# Patient Record
Sex: FEMALE | Race: WHITE | NOT HISPANIC OR LATINO | Employment: UNEMPLOYED | URBAN - METROPOLITAN AREA
[De-identification: names, ages, dates, MRNs, and addresses within clinical notes are randomized per-mention and may not be internally consistent; named-entity substitution may affect disease eponyms.]

---

## 2017-01-10 ENCOUNTER — OFFICE VISIT (OUTPATIENT)
Dept: PHYSICAL MEDICINE AND REHAB | Facility: CLINIC | Age: 37
End: 2017-01-10
Payer: MEDICAID

## 2017-01-10 VITALS
RESPIRATION RATE: 14 BRPM | SYSTOLIC BLOOD PRESSURE: 101 MMHG | WEIGHT: 121 LBS | BODY MASS INDEX: 22.26 KG/M2 | HEART RATE: 96 BPM | HEIGHT: 62 IN | DIASTOLIC BLOOD PRESSURE: 66 MMHG

## 2017-01-10 DIAGNOSIS — M47.817 LUMBOSACRAL SPONDYLOSIS WITHOUT MYELOPATHY: Primary | ICD-10-CM

## 2017-01-10 DIAGNOSIS — M54.10 RADICULAR SYNDROME OF LEFT LEG: ICD-10-CM

## 2017-01-10 PROCEDURE — 99999 PR PBB SHADOW E&M-EST. PATIENT-LVL III: CPT | Mod: PBBFAC,,, | Performed by: PHYSICAL MEDICINE & REHABILITATION

## 2017-01-10 PROCEDURE — 99213 OFFICE O/P EST LOW 20 MIN: CPT | Mod: PBBFAC,PO | Performed by: PHYSICAL MEDICINE & REHABILITATION

## 2017-01-10 PROCEDURE — 99213 OFFICE O/P EST LOW 20 MIN: CPT | Mod: S$PBB,,, | Performed by: PHYSICAL MEDICINE & REHABILITATION

## 2017-01-10 RX ORDER — METHOCARBAMOL 750 MG/1
750 TABLET, FILM COATED ORAL NIGHTLY PRN
Qty: 30 TABLET | Refills: 5 | Status: SHIPPED | OUTPATIENT
Start: 2017-01-10 | End: 2017-11-27 | Stop reason: SDUPTHER

## 2017-01-10 RX ORDER — TRAMADOL HYDROCHLORIDE 50 MG/1
50-100 TABLET ORAL 4 TIMES DAILY PRN
Qty: 240 TABLET | Refills: 5 | Status: SHIPPED | OUTPATIENT
Start: 2017-01-10 | End: 2017-07-19 | Stop reason: SDUPTHER

## 2017-01-10 RX ORDER — IBUPROFEN 600 MG/1
600 TABLET ORAL 3 TIMES DAILY
Qty: 60 TABLET | Refills: 5 | Status: SHIPPED | OUTPATIENT
Start: 2017-01-10 | End: 2017-06-16

## 2017-01-10 NOTE — MR AVS SNAPSHOT
[facility]  [address] [place] [address]  Phone: [phone]  Fax: [phone]                  Yue Reyes   1/10/2017 10:40 AM   Office Visit    Description:  Female : 1980   Provider:  Mayra Adler MD   Department:  [facility]           Reason for Visit     Back Pain           Diagnoses this Visit        Comments    Lumbosacral spondylosis without myelopathy    -  Primary     Radicular syndrome of left leg                To Do List           Future Appointments        Provider Department Dept Phone    7/10/2017 11:00 AM Mayra Adler MD [facility] [phone]      Goals (5 Years of Data)     None      Follow-Up and Disposition     Return in about 6 months (around 7/10/2017).    Follow-up and Disposition History       These Medications        Disp Refills Start End    ibuprofen (ADVIL,MOTRIN) 600 MG tablet 60 tablet 5 1/10/2017     Take 1 tablet (600 mg total) by mouth 3 (three) times daily. - Oral    Pharmacy: [facility] [address] [place] [address] [address] AT [facility] [address] Ph #: [phone]       methocarbamol (ROBAXIN) 750 MG Tab 30 tablet 5 1/10/2017     Take 1 tablet (750 mg total) by mouth nightly as needed. - Oral    Pharmacy: [facility] [address] [place] [address] [address] AT [facility] [address] Ph #: [phone]       tramadol (ULTRAM) 50 mg tablet 240 tablet 5 1/10/2017     Take 1-2 tablets ( mg total) by mouth 4 (four) times daily as needed. - Oral    Pharmacy: [facility] [address] [place] [address] [address] AT [facility] [address] Ph #: [phone]         [facility]     [facility] On [facility] Nurse Care Line -  Assistance  Registered nurses in the [facility] Call Center provide clinical advisement, health education, appointment booking, and other advisory services.  Call for this free service at [phone].             Medications           Message regarding Medications      "Verify the changes and/or additions to your medication regime listed below are the same as discussed with your clinician today.  If any of these changes or additions are incorrect, please notify your healthcare provider.        CHANGE how you are taking these medications     Start Taking Instead of    tramadol (ULTRAM) 50 mg tablet tramadol (ULTRAM) 50 mg tablet    Dosage:  Take 1-2 tablets ( mg total) by mouth 4 (four) times daily as needed. Dosage:  Take 1-2 tablets ( mg total) by mouth 3 (three) times daily as needed.    Reason for Change:  Reorder            Verify that the below list of medications is an accurate representation of the medications you are currently taking.  If none reported, the list may be blank. If incorrect, please contact your healthcare provider. Carry this list with you in case of emergency.           Current Medications     gabapentin (NEURONTIN) 300 MG capsule Take 1 capsule (300 mg total) by mouth 3 (three) times daily.    ibuprofen (ADVIL,MOTRIN) 600 MG tablet Take 1 tablet (600 mg total) by mouth 3 (three) times daily.    methocarbamol (ROBAXIN) 750 MG Tab Take 1 tablet (750 mg total) by mouth nightly as needed.    tramadol (ULTRAM) 50 mg tablet Take 1-2 tablets ( mg total) by mouth 4 (four) times daily as needed.           Clinical Reference Information           Vital Signs - Last Recorded  Most recent update: 1/10/2017 11:01 AM by Louann Sanches LPN    BP Pulse Resp Ht Wt BMI    101/66 96 14 5' 2" (1.575 m) 54.9 kg (121 lb) 22.13 kg/m2      Blood Pressure          Most Recent Value    BP  101/66      Allergies as of 1/10/2017     Tizanidine      Immunizations Administered on Date of Encounter - 1/10/2017     None      [facility] Sign-Up     Activating your [facility] account is as easy as 1-2-3!     1) Visit [email or web address], select Sign Up Now, enter this activation code and your date of birth, then select Next.  [email or web address]  Expires: 2/24/2017 11:17 AM  "     2) Create a username and password to use when you visit [facility] in the future and select a security question in case you lose your password and select Next.    3) Enter your e-mail address and click Sign Up!    Additional Information  If you have questions, please e-mail [email or web address] or call [phone] to talk to our [facility] staff. Remember, [facility] is NOT to be used for urgent needs. For medical emergencies, dial 911.

## 2017-01-10 NOTE — PROGRESS NOTES
PM&R  CLINIC NOTE  Chief Complaint   Patient presents with    Back Pain     low back, to the leg     HPI: This is a 36 y.o. year old female being seen in clinic today for follow up back pain.  She reports improvement with steroid pack that has helped until recently. She was unable to find a viable insurance option.      History obtained from patient    ROS  General- denies lethargy, weight change, fever, chills  Head/neck- denies swallowing difficulties  ENT- denies hearing changes  Cardiovascular-denies chest pain  Pulmonary- denies shortness of breath  GI- denies constipation or bowel incontinence  - denies bladder incontinence  Skin- denies wounds or rashes  Musculoskeletal- denies weakness, +pain  Neurologic- +numbness and tingling  Psychiatric- denies depressive or psychotic features, denies anxiety  Lymphatic-denies swelling  Endocrine- denies hypoglycemic symptoms/DM history      Physical Examination:  General: Well developed, well nourished female, NAD    Spinal Examination: LUMBAR or THORACIC  Active ROM is within normal limits.  Inspection: No deformity of spinal alignment. No palpable olisthesis.  Palpation:  ttp si joints and glut musculature, tight paraspinal muscles  ttp at 10/18 fibromyalgia points  Seated slr negative    Bilateral upper and lower Extremities:  Hip/Knee/Ankle ROM wnl  Bilateral Extremities show normal capillary refill. No signs of cyanosis, rubor, edema, skin changes, or dysvascular changes of appendages. Nails appear intact.  5/5 strength bilateral lower exts    No focal atrophy is noted of either upper or lower extremity.    Gait: normal stride, narrow base and good arm swing.    IMPRESSION/PLAN: This is a 36 y.o. year old female with mild lumbar DJD, left leg radiculitis, myofascial pain/fibromyalgia, very mild CTS, right knee pain-stable    1. Cont core strengthening, myofascial release, stretch, ROM, modalities, HEP  2. Gabapentin 300-900 mg QHS  3. Cont ibuprofen  4. Cont  tramadol 50-100mg TID prn sparingly  5. Cont si belt, cont neutral wrist braces  6. MRI and referral for possible RODGER (pt aware of lack of insurance coverage)    Mayra Adler MD  Physical Medicine and Rehab

## 2017-01-12 DIAGNOSIS — M47.817 LUMBOSACRAL SPONDYLOSIS WITHOUT MYELOPATHY: ICD-10-CM

## 2017-01-12 RX ORDER — TRAMADOL HYDROCHLORIDE 50 MG/1
TABLET ORAL
Qty: 90 TABLET | Refills: 0 | OUTPATIENT
Start: 2017-01-12

## 2017-06-16 ENCOUNTER — OFFICE VISIT (OUTPATIENT)
Dept: PHYSICAL MEDICINE AND REHAB | Facility: CLINIC | Age: 37
End: 2017-06-16
Payer: MEDICAID

## 2017-06-16 VITALS
WEIGHT: 120 LBS | RESPIRATION RATE: 14 BRPM | HEART RATE: 82 BPM | SYSTOLIC BLOOD PRESSURE: 92 MMHG | HEIGHT: 62 IN | DIASTOLIC BLOOD PRESSURE: 62 MMHG | BODY MASS INDEX: 22.08 KG/M2

## 2017-06-16 DIAGNOSIS — M25.50 MULTIPLE JOINT PAIN: ICD-10-CM

## 2017-06-16 DIAGNOSIS — M47.817 LUMBOSACRAL SPONDYLOSIS WITHOUT MYELOPATHY: Primary | ICD-10-CM

## 2017-06-16 PROCEDURE — 99213 OFFICE O/P EST LOW 20 MIN: CPT | Mod: S$PBB,,, | Performed by: PHYSICAL MEDICINE & REHABILITATION

## 2017-06-16 PROCEDURE — 99999 PR PBB SHADOW E&M-EST. PATIENT-LVL III: CPT | Mod: PBBFAC,,, | Performed by: PHYSICAL MEDICINE & REHABILITATION

## 2017-06-16 PROCEDURE — 99213 OFFICE O/P EST LOW 20 MIN: CPT | Mod: PBBFAC,PO | Performed by: PHYSICAL MEDICINE & REHABILITATION

## 2017-06-16 NOTE — PROGRESS NOTES
PM&R  CLINIC NOTE  Chief Complaint   Patient presents with    Hip Pain     bilateral; down to the legs     HPI: This is a 37 y.o. year old female being seen in clinic today for follow up back pain.  She reports issues with tramadol and plans to taper off to dc.  Advil helps somewhat.  Her main concern is multiple joint achy pain-hands, low back, hips, and knees.      History obtained from patient    ROS  General- denies lethargy, weight change, fever, chills  Head/neck- denies swallowing difficulties  ENT- denies hearing changes  Cardiovascular-denies chest pain  Pulmonary- denies shortness of breath  GI- denies constipation or bowel incontinence  - denies bladder incontinence  Skin- denies wounds or rashes  Musculoskeletal- denies weakness, +pain  Neurologic- +numbness and tingling  Psychiatric- denies depressive or psychotic features, denies anxiety  Lymphatic-denies swelling  Endocrine- denies hypoglycemic symptoms/DM history      Physical Examination:  General: Well developed, well nourished female, NAD    Spinal Examination: LUMBAR or THORACIC  Active ROM is within normal limits.  Inspection: No deformity of spinal alignment. No palpable olisthesis.  Palpation:still ttp si joints and glut musculature, tight paraspinal muscles  ttp at 10/18 fibromyalgia points  Seated slr negative    Bilateral upper and lower Extremities:  Hip/Knee/Ankle ROM wnl, nodules at finger jts  Bilateral Extremities show normal capillary refill. No signs of cyanosis, rubor, edema, skin changes, or dysvascular changes of appendages. Nails appear intact.  5/5 strength bilateral lower exts    No focal atrophy is noted of either upper or lower extremity.    Gait: normal stride, narrow base and good arm swing.    IMPRESSION/PLAN: This is a 37 y.o. year old female with mild lumbar DJD, left leg radiculitis, myofascial pain/fibromyalgia, very mild CTS, right knee pain-stable    1. Cont core strengthening, myofascial release, stretch, ROM,  modalities, HEP  2. Consider seeing rheumatology  3. Cont si belt, cont neutral wrist braces    Mayra Adler MD  Physical Medicine and Rehab

## 2017-07-03 ENCOUNTER — TELEPHONE (OUTPATIENT)
Dept: ALLERGY | Facility: CLINIC | Age: 37
End: 2017-07-03

## 2017-07-03 NOTE — TELEPHONE ENCOUNTER
----- Message from Halina Berg sent at 7/3/2017  8:09 AM CDT -----  1. What is the name of the medication you are requesting? tramadol  2. What is the dose? 50?  3. How do you take the medication? Orally, topically, etc? orally  4. How often do you take this medication? up to 8/day  5. Do you need a 30 day or 90 day supply? 90 if poss  6. How many refills are you requesting? 1  7. What is your preferred pharmacy and location of the pharmacy? ..  [facility] [address] [place] [address] [address] [facility] AT [facility] of [facility] [address] [address]  Phone: [phone] Fax: [phone]    8. Who can we contact with further questions? p//[phone]

## 2017-07-18 DIAGNOSIS — M47.817 LUMBOSACRAL SPONDYLOSIS WITHOUT MYELOPATHY: ICD-10-CM

## 2017-07-18 RX ORDER — GABAPENTIN 300 MG/1
CAPSULE ORAL
Qty: 90 CAPSULE | Refills: 3 | Status: SHIPPED | OUTPATIENT
Start: 2017-07-18 | End: 2018-01-30 | Stop reason: SDUPTHER

## 2017-07-19 DIAGNOSIS — M47.817 LUMBOSACRAL SPONDYLOSIS WITHOUT MYELOPATHY: ICD-10-CM

## 2017-07-19 RX ORDER — TRAMADOL HYDROCHLORIDE 50 MG/1
50-100 TABLET ORAL 4 TIMES DAILY PRN
Qty: 240 TABLET | Refills: 5 | Status: SHIPPED | OUTPATIENT
Start: 2017-07-19 | End: 2018-02-07 | Stop reason: SDUPTHER

## 2017-11-27 DIAGNOSIS — M54.10 RADICULAR SYNDROME OF LEFT LEG: ICD-10-CM

## 2017-11-27 DIAGNOSIS — M47.817 LUMBOSACRAL SPONDYLOSIS WITHOUT MYELOPATHY: ICD-10-CM

## 2017-11-27 RX ORDER — METHOCARBAMOL 750 MG/1
TABLET, FILM COATED ORAL
Qty: 30 TABLET | Refills: 0 | Status: SHIPPED | OUTPATIENT
Start: 2017-11-27 | End: 2018-02-20 | Stop reason: SDUPTHER

## 2018-01-30 DIAGNOSIS — M47.817 LUMBOSACRAL SPONDYLOSIS WITHOUT MYELOPATHY: ICD-10-CM

## 2018-01-30 RX ORDER — GABAPENTIN 300 MG/1
CAPSULE ORAL
Qty: 90 CAPSULE | Refills: 5 | Status: SHIPPED | OUTPATIENT
Start: 2018-01-30 | End: 2018-08-07 | Stop reason: SDUPTHER

## 2018-02-07 DIAGNOSIS — M47.817 LUMBOSACRAL SPONDYLOSIS WITHOUT MYELOPATHY: ICD-10-CM

## 2018-02-07 RX ORDER — TRAMADOL HYDROCHLORIDE 50 MG/1
TABLET ORAL
Qty: 240 TABLET | Refills: 0 | OUTPATIENT
Start: 2018-02-07

## 2018-02-07 RX ORDER — TRAMADOL HYDROCHLORIDE 50 MG/1
50-100 TABLET ORAL 4 TIMES DAILY PRN
Qty: 240 TABLET | Refills: 0 | Status: SHIPPED | OUTPATIENT
Start: 2018-02-07 | End: 2018-02-20 | Stop reason: SDUPTHER

## 2018-02-07 NOTE — TELEPHONE ENCOUNTER
----- Message from Gabi Mari sent at 2/7/2018 10:39 AM CST -----  Contact: self   Patient would like to consult with nurse regarding approving refill until appointment on 2/20/18. Please call back at [phone].    Thanks,  Gabi Mari

## 2018-02-20 ENCOUNTER — OFFICE VISIT (OUTPATIENT)
Dept: PHYSICAL MEDICINE AND REHAB | Facility: CLINIC | Age: 38
End: 2018-02-20
Payer: MEDICAID

## 2018-02-20 VITALS
HEIGHT: 62 IN | WEIGHT: 120 LBS | BODY MASS INDEX: 22.08 KG/M2 | HEART RATE: 115 BPM | SYSTOLIC BLOOD PRESSURE: 110 MMHG | RESPIRATION RATE: 14 BRPM | DIASTOLIC BLOOD PRESSURE: 69 MMHG

## 2018-02-20 DIAGNOSIS — M47.817 LUMBOSACRAL SPONDYLOSIS WITHOUT MYELOPATHY: Primary | ICD-10-CM

## 2018-02-20 DIAGNOSIS — M25.50 MULTIPLE JOINT PAIN: ICD-10-CM

## 2018-02-20 DIAGNOSIS — M54.10 RADICULAR SYNDROME OF LEFT LEG: ICD-10-CM

## 2018-02-20 PROCEDURE — 99999 PR PBB SHADOW E&M-EST. PATIENT-LVL III: CPT | Mod: PBBFAC,,, | Performed by: PHYSICAL MEDICINE & REHABILITATION

## 2018-02-20 PROCEDURE — 3008F BODY MASS INDEX DOCD: CPT | Mod: ,,, | Performed by: PHYSICAL MEDICINE & REHABILITATION

## 2018-02-20 PROCEDURE — 99214 OFFICE O/P EST MOD 30 MIN: CPT | Mod: S$PBB,,, | Performed by: PHYSICAL MEDICINE & REHABILITATION

## 2018-02-20 PROCEDURE — 99213 OFFICE O/P EST LOW 20 MIN: CPT | Mod: PBBFAC,PO | Performed by: PHYSICAL MEDICINE & REHABILITATION

## 2018-02-20 RX ORDER — METHYLPREDNISOLONE 4 MG/1
TABLET ORAL
Qty: 1 PACKAGE | Refills: 0 | Status: SHIPPED | OUTPATIENT
Start: 2018-02-20 | End: 2018-03-13

## 2018-02-20 RX ORDER — TRAMADOL HYDROCHLORIDE 50 MG/1
50-100 TABLET ORAL 4 TIMES DAILY PRN
Qty: 240 TABLET | Refills: 5 | Status: SHIPPED | OUTPATIENT
Start: 2018-02-20 | End: 2018-08-07 | Stop reason: SDUPTHER

## 2018-02-20 RX ORDER — METHOCARBAMOL 750 MG/1
TABLET, FILM COATED ORAL
Qty: 30 TABLET | Refills: 5 | Status: SHIPPED | OUTPATIENT
Start: 2018-02-20 | End: 2018-08-07 | Stop reason: SDUPTHER
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
## 2018-08-07 ENCOUNTER — OFFICE VISIT (OUTPATIENT)
Dept: PHYSICAL MEDICINE AND REHAB | Facility: CLINIC | Age: 38
End: 2018-08-07
Payer: MEDICAID

## 2018-08-07 VITALS
DIASTOLIC BLOOD PRESSURE: 74 MMHG | SYSTOLIC BLOOD PRESSURE: 106 MMHG | HEIGHT: 62 IN | HEART RATE: 96 BPM | BODY MASS INDEX: 22.08 KG/M2 | RESPIRATION RATE: 14 BRPM | WEIGHT: 120 LBS

## 2018-08-07 DIAGNOSIS — M77.11 BILATERAL TENNIS ELBOW: ICD-10-CM

## 2018-08-07 DIAGNOSIS — M47.817 LUMBOSACRAL SPONDYLOSIS WITHOUT MYELOPATHY: Primary | ICD-10-CM

## 2018-08-07 DIAGNOSIS — M77.12 BILATERAL TENNIS ELBOW: ICD-10-CM

## 2018-08-07 DIAGNOSIS — M54.10 RADICULAR SYNDROME OF LEFT LEG: ICD-10-CM

## 2018-08-07 DIAGNOSIS — M25.50 MULTIPLE JOINT PAIN: ICD-10-CM

## 2018-08-07 PROCEDURE — 99999 PR PBB SHADOW E&M-EST. PATIENT-LVL III: CPT | Mod: PBBFAC,,, | Performed by: PHYSICAL MEDICINE & REHABILITATION

## 2018-08-07 PROCEDURE — 99213 OFFICE O/P EST LOW 20 MIN: CPT | Mod: PBBFAC,PO | Performed by: PHYSICAL MEDICINE & REHABILITATION

## 2018-08-07 PROCEDURE — 99214 OFFICE O/P EST MOD 30 MIN: CPT | Mod: S$PBB,,, | Performed by: PHYSICAL MEDICINE & REHABILITATION

## 2018-08-07 RX ORDER — TRAMADOL HYDROCHLORIDE 50 MG/1
50-100 TABLET ORAL 4 TIMES DAILY PRN
Qty: 240 TABLET | Refills: 5 | Status: SHIPPED | OUTPATIENT
Start: 2018-08-07 | End: 2019-02-11 | Stop reason: SDUPTHER

## 2018-08-07 RX ORDER — GABAPENTIN 300 MG/1
CAPSULE ORAL
Qty: 90 CAPSULE | Refills: 5 | Status: SHIPPED | OUTPATIENT
Start: 2018-08-07 | End: 2019-02-11 | Stop reason: SDUPTHER

## 2018-08-07 RX ORDER — METHOCARBAMOL 750 MG/1
TABLET, FILM COATED ORAL
Qty: 30 TABLET | Refills: 5 | Status: SHIPPED | OUTPATIENT
Start: 2018-08-07 | End: 2019-02-11 | Stop reason: SDUPTHER

## 2018-08-07 NOTE — PROGRESS NOTES
PM&R  CLINIC NOTE    Chief Complaint   Patient presents with    Back Pain     lower back    Hip Pain     bilateral     Elbow Pain     right     HPI: This is a 38 y.o. year old female being seen in clinic today for follow up back pain.  She has been working a lot on her home which has caused achy pain in her elbows.  Wearing a brace provides some relief.  Her main concern is getting her daughter set up in schools or resources for autism.     History obtained from patient    ROS  General- denies lethargy, weight change, fever, chills  Head/neck- denies swallowing difficulties  ENT- denies hearing changes  Cardiovascular-denies chest pain  Pulmonary- denies shortness of breath  GI- denies constipation or bowel incontinence  - denies bladder incontinence  Skin- denies wounds or rashes  Musculoskeletal- denies weakness, +pain  Neurologic- +numbness and tingling  Psychiatric- denies depressive or psychotic features, denies anxiety  Lymphatic-denies swelling  Endocrine- denies hypoglycemic symptoms/DM history    Physical Examination:  General: Well developed, well nourished female, NAD  HEENT: NCAT EOMI  Pulmonary: Normal respirations    Spinal Examination: LUMBAR or THORACIC  Active ROM is within normal limits.  Inspection: No deformity of spinal alignment. No palpable olisthesis.  Palpation:ttp si joints and glut medius, tight paraspinal muscles  ttp at 10/18 fibromyalgia points  Seated slr negative    Bilateral upper and lower Extremities:  Hip/Knee/Ankle ROM wnl, nodules at finger jts, ttp at right lateral epicondyle-worse in ext, mild on left lateral epicondyle  Bilateral Extremities show normal capillary refill. No signs of cyanosis, rubor, edema, skin changes, or dysvascular changes of appendages. Nails appear intact.  5/5 strength bilateral lower exts    No focal atrophy is noted of either upper or lower extremity.    Gait: normal stride, narrow base and good arm swing.    IMPRESSION/PLAN: This is a 38 y.o.  year old female with mild lumbar DJD, left leg radiculitis-stable, myofascial pain/fibromyalgia, very mild CTS, right knee pain-stable, lateral epicondylitis     1. Cont core strengthening, myofascial release, stretch, ROM, modalities, HEP  2. Still rec seeing rheumatology  3. Cont si belt, cont neutral wrist braces  4. Cont gabapentin, robaxin, and tramadol prn  5. Fu 6 months    Mayra Adler MD  Physical Medicine and Rehab

## 2018-08-07 NOTE — PATIENT INSTRUCTIONS
Understanding Lateral Epicondylitis    Tendons are strong bands of tissue that connect muscles to bones. Lateral epicondylitis affects the tendons that connect muscles in the forearm to the lateral epicondyle. This is the bony knob on the outer side of the elbow. The condition occurs if the extensor tendons of the wrist become red and swollen (irritated). This can cause pain in the elbow, forearm, and wrist. Because the condition is sometimes caused by playing tennis, it is also known as tennis elbow.  How to say it  LA-tuhr-diane [id number]-[id number]-LY-tis   What causes lateral epicondylitis?  The condition most often occurs because of overuse. This can be from any activity that repeatedly puts stress on the forearm extensor muscles or tendons and wrist. For instance, playing tennis, lifting weights, cutting meat, painting, and typing can all cause the condition. Wear and tear of the tendons from aging or an injury to the tendons can also cause the condition.  Symptoms of lateral epicondylitis  The most common symptom is pain. You may feel it on the outer side of the elbow and down the back of the forearm. It may be worse when moving or using the elbow, forearm, or wrist. You may also feel pain when gripping or lifting things.  Treatment for lateral epicondylitis  Treatments may include:  · Resting the elbow, forearm, and wrist. Youll need to avoid movements that can make your symptoms worse. You also may need to avoid certain sports and types of work for a time. This helps relieve symptoms and prevent further damage to the tendons.  · Changing the action that caused the problem. For instance, if the tendons were damaged from playing tennis, it may help to change your playing technique or use different equipment. This helps prevent further damage to the tendons.  · Using cold packs. Putting an ice pack on the injured area can help reduce pain and swelling.  · Taking pain medicines. Taking prescription or  over-the-counter pain medicines may help reduce pain and swelling.    · Wearing a brace. This helps reduce strain on the muscles and tendons in the forearm, which may relieve symptoms. It is very important to wear the brace properly.  · Doing exercises and physical therapy. These help improve strength and range of motion in the elbow, forearm, and wrist.  · Getting shots of medicine into the injured area. These may help relieve symptoms for a time.  · Having surgery. This may be an option if other treatments fail to relieve symptoms. In many cases, the surgeon removes the damaged tissue.  Possible complications of lateral epicondylitis  If the tendons involved dont heal properly, symptoms may return or get worse. To help prevent this, follow your treatment plan as directed.  When to call your healthcare provider  Call your healthcare provider right away if you have any of these:  · Fever of 100.4°F (38°C) or higher, or as directed  · Redness, swelling, or warmth in the elbow or forearm that gets worse  · Symptoms that dont get better with treatment, or get worse  · New symptoms   Date Last Reviewed: 3/10/2016  © 1377-4437 [employer]. [address] [place] [address]. All rights reserved. This information is not intended as a substitute for professional medical care. Always follow your healthcare professional's instructions.        Treating Tennis Elbow    Your treatment will depend on how inflamed your tendon is. The goal is to relieve your symptoms and help you regain full use of your elbow.  Rest and medicine  Wearing a tennis elbow splint allows the inflamed tendon to rest. It must be worn properly. It should be placed down the arm past the painful area of the elbow. If it is directly over the inflamed tendon, it can worsen the symptoms. This brace can help the tendon heal. Using your other hand or changing your  also takes stress off the tendon. Oral  nonsteroidal anti-inflammatory medicines (NSAIDs) and/or ice can relieve pain and reduce swelling.  Exercises and therapy  Your healthcare provider may give you an exercise program. He or she may refer you to a therapist. The therapist will teach you to gently stretch and then strengthen the muscles around your elbow.  Anti-inflammatory injections  Your healthcare provider may give you injections of an anti-inflammatory, such as cortisone. This helps reduce swelling. You may have more pain at first. But in a few days, your elbow should feel better.  If surgery is needed  If your symptoms persist for a long time, or other treatments dont work, your healthcare provider may recommend surgery. Surgery repairs the inflamed tendon.   Date Last Reviewed: 9/26/2015 © 2000-2017 [employer]. [address] [place] [address], [place] [address]. All rights reserved. This information is not intended as a substitute for professional medical care. Always follow your healthcare professional's instructions.        Tennis Elbow  Muscles connect to bones by thick, fibrous cords (tendons). When the muscles are overused by repeated motion, the tendons may become inflamed and painful. This condition is called tendonitis.  Tennis elbow (lateral epicondylitis) is a form of tendonitis. It occurs when the forearm muscles are used again and again in a twisting motion. Pain from tennis elbow occurs mainly on the outside of the elbow. But the pain can spread into the forearm and wrist. Your elbow may also be swollen and tender to the touch.  The pain may get worse when you move your arm or do simple activities. Bending your wrist back, shaking hands, or turning a doorknob may cause pain. The pain often gets worse after several weeks or months. Sometimes you may feel pain when your arm is still.  Tennis players who use a backhand stroke with poor technique are more likely to get tennis elbow. But playing tennis is only one cause of tennis  elbow. Other common activities that can cause it include:  · Hammering  · Painting  · Raking  Besides tennis players, people at risk include , gardeners, musicians, and dentists. Sometimes people get tennis elbow without doing anything that would cause the injury.  Treatment includes resting the arm and taking anti-inflammatory medicines. Special splints help ease symptoms. Symptoms should get better after 4 to 6 weeks of rest. You may need steroid injections if resting and using a splint dont help. After the pain is relieved, you should change your activities so the symptoms dont return. You may need physical therapy. It may include stretching, range-of-motion, and strengthening exercises. These treatments help most cases. You may need surgery if your symptoms continue for 6 months.  Home care  Follow these guidelines when caring for yourself at home:  · Rest your elbow as needed. Protect it from movement that causes pain. You may be told to use a forearm splint at night to ease symptoms in the morning. Your health care provider may recommend a special wrap or splint to compress the muscles of the forearm. This can ease pain during daytime activities. As your symptoms get better, start to move your elbow more.  · Put an ice pack on the injured area. Do this for 20 minutes every 1 to 2 hours the first day for pain relief. You can make an ice pack by wrapping a plastic bag of ice cubes in a thin towel. Continue using the ice pack 3 to 4 times a day for the next 2 days. Then use the ice pack as needed to ease pain and swelling.  · You may use acetaminophen or ibuprofen to control pain, unless another pain medicine was prescribed. If you have chronic liver or kidney disease, talk with your health care provider before using these medicines. Also talk with your provider if youve had a stomach ulcer or GI bleeding.  · After your elbow heals, avoid the motion that caused your pain. Or learn to move in a way  that causes less stress on the tendon. Using a forearm wrap may keep tennis elbow from happening again.  · A tennis elbow strap may ease pain and keep you from further injury when you start playing tennis again. You can also lower your risk for injury by warming up before you play and cooling down afterward. You should also use the right equipment. For instance, make sure your racquet has the right  and is the right size for you.  Follow-up care  Follow up with your health care provider, or as advised, if your symptoms dont get better after 1 week of treatment.  When to seek medical advice  Call your health care provider right away if any of these occur:  · Redness over the painful area  · Pain or swelling at the elbow gets worse  · Any numbness or tingling in your arm, hands, or fingers  · Unexplained fever over 101ºF (37.8ºC)   Date Last Reviewed: 2/17/2015  © 1359-9183 [employer]. [address] [place] [address], [place] [address]. All rights reserved. This information is not intended as a substitute for professional medical care. Always follow your healthcare professional's instructions.

## 2019-02-06 DIAGNOSIS — M47.817 LUMBOSACRAL SPONDYLOSIS WITHOUT MYELOPATHY: ICD-10-CM

## 2019-02-06 RX ORDER — TRAMADOL HYDROCHLORIDE 50 MG/1
TABLET ORAL
Qty: 240 TABLET | Refills: 0 | OUTPATIENT
Start: 2019-02-06

## 2019-02-06 NOTE — TELEPHONE ENCOUNTER
----- Message from Lakshmi Oconnor sent at 2/6/2019  8:59 AM CST -----  Contact: [phone]  WOULD LIKE TO CONSULT WITH THE NURSE PATIENTS WOULD LIKE TO SCHEDULE AN APPT  PATIENTS WOULD LIKE TO SPEAK WITH THE NURSE CONCERNIING THIS APPT,,PLEASE CALL BACK [phone], THANKS SJ

## 2019-02-11 ENCOUNTER — OFFICE VISIT (OUTPATIENT)
Dept: PHYSICAL MEDICINE AND REHAB | Facility: CLINIC | Age: 39
End: 2019-02-11
Payer: MEDICAID

## 2019-02-11 VITALS
HEIGHT: 62 IN | HEART RATE: 112 BPM | BODY MASS INDEX: 22.08 KG/M2 | WEIGHT: 120 LBS | RESPIRATION RATE: 14 BRPM | SYSTOLIC BLOOD PRESSURE: 124 MMHG | DIASTOLIC BLOOD PRESSURE: 73 MMHG

## 2019-02-11 DIAGNOSIS — M54.10 RADICULAR SYNDROME OF LEFT LEG: ICD-10-CM

## 2019-02-11 DIAGNOSIS — M79.18 MYOFASCIAL PAIN: ICD-10-CM

## 2019-02-11 DIAGNOSIS — M47.817 LUMBOSACRAL SPONDYLOSIS WITHOUT MYELOPATHY: ICD-10-CM

## 2019-02-11 DIAGNOSIS — M25.50 MULTIPLE JOINT PAIN: Primary | ICD-10-CM

## 2019-02-11 PROCEDURE — 99214 OFFICE O/P EST MOD 30 MIN: CPT | Mod: S$PBB,,, | Performed by: PHYSICAL MEDICINE & REHABILITATION

## 2019-02-11 PROCEDURE — 99999 PR PBB SHADOW E&M-EST. PATIENT-LVL III: ICD-10-PCS | Mod: PBBFAC,,, | Performed by: PHYSICAL MEDICINE & REHABILITATION

## 2019-02-11 PROCEDURE — 99214 PR OFFICE/OUTPT VISIT, EST, LEVL IV, 30-39 MIN: ICD-10-PCS | Mod: S$PBB,,, | Performed by: PHYSICAL MEDICINE & REHABILITATION

## 2019-02-11 PROCEDURE — 99213 OFFICE O/P EST LOW 20 MIN: CPT | Mod: PBBFAC,PN | Performed by: PHYSICAL MEDICINE & REHABILITATION

## 2019-02-11 PROCEDURE — 99999 PR PBB SHADOW E&M-EST. PATIENT-LVL III: CPT | Mod: PBBFAC,,, | Performed by: PHYSICAL MEDICINE & REHABILITATION

## 2019-02-11 RX ORDER — METHOCARBAMOL 750 MG/1
TABLET, FILM COATED ORAL
Qty: 30 TABLET | Refills: 5 | Status: SHIPPED | OUTPATIENT
Start: 2019-02-11 | End: 2019-08-12 | Stop reason: SDUPTHER

## 2019-02-11 RX ORDER — TRAMADOL HYDROCHLORIDE 50 MG/1
50-100 TABLET ORAL 4 TIMES DAILY PRN
Qty: 240 TABLET | Refills: 5 | Status: SHIPPED | OUTPATIENT
Start: 2019-02-11 | End: 2019-08-12 | Stop reason: SDUPTHER

## 2019-02-11 RX ORDER — GABAPENTIN 300 MG/1
CAPSULE ORAL
Qty: 90 CAPSULE | Refills: 5 | Status: SHIPPED | OUTPATIENT
Start: 2019-02-11 | End: 2019-08-12 | Stop reason: SDUPTHER

## 2019-02-11 NOTE — PROGRESS NOTES
PM&R  CLINIC NOTE    Chief Complaint   Patient presents with    Hip Pain    Knee Pain     HPI: This is a 38 y.o. year old female being seen in clinic today for follow up back pain.  She has improved low back pain after limiting her heavy lifting and using her massage chair.  She continues to have knee and hip pain, but she stays active.    History obtained from patient    ROS  General- denies lethargy, weight change, fever, chills  Head/neck- denies swallowing difficulties  ENT- denies hearing changes  Cardiovascular-denies chest pain  Pulmonary- denies shortness of breath  GI- denies constipation or bowel incontinence  - denies bladder incontinence  Skin- denies wounds or rashes  Musculoskeletal- denies weakness, +pain  Neurologic- +numbness and tingling  Psychiatric- denies depressive or psychotic features, denies anxiety  Lymphatic-denies swelling  Endocrine- denies hypoglycemic symptoms/DM history    Physical Examination:  General: Well developed, well nourished female, NAD  HEENT: NCAT EOMI  Pulmonary: Normal respirations    Spinal Examination: LUMBAR or THORACIC  Active ROM is within normal limits.  Inspection: No deformity of spinal alignment. No palpable olisthesis.  Palpation:ttp si joints and glut medius, tight paraspinal muscles  ttp at 10/18 fibromyalgia points  Seated slr negative    Bilateral upper and lower Extremities:  Hip/Knee/Ankle ROM wnl, nodules at finger jts, ttp at right lateral epicondyle-worse in ext, mild on left lateral epicondyle  Bilateral Extremities show normal capillary refill. No signs of cyanosis, rubor, edema, skin changes, or dysvascular changes of appendages. Nails appear intact.  5/5 strength bilateral lower exts    No focal atrophy is noted of either upper or lower extremity.    Gait: normal stride, narrow base and good arm swing.    IMPRESSION/PLAN: This is a 38 y.o. year old female with mild lumbar DJD, left leg radiculitis-stable, myofascial pain/fibromyalgia, very mild  CTS, right knee pain-stable, lateral epicondylitis     1. Cont core strengthening, myofascial release, stretch, ROM, modalities, HEP  2. Still rec seeing rheumatology  3. Cont si belt, cont neutral wrist braces  4. Cont gabapentin, robaxin, and tramadol prn  5. Fu 6 months    Mayra Adler MD  Physical Medicine and Rehab

## 2019-06-27 ENCOUNTER — TELEPHONE (OUTPATIENT)
Dept: PHYSICAL MEDICINE AND REHAB | Facility: CLINIC | Age: 39
End: 2019-06-27

## 2019-06-27 RX ORDER — METHYLPREDNISOLONE 4 MG/1
TABLET ORAL
Qty: 1 PACKAGE | Refills: 0 | Status: SHIPPED | OUTPATIENT
Start: 2019-06-27 | End: 2019-07-18

## 2019-06-27 NOTE — TELEPHONE ENCOUNTER
----- Message from Vianey Yost sent at 6/27/2019  1:37 PM CDT -----  ..Type:  Sooner Apoointment Request    Caller is requesting a sooner appointment.  Caller declined first available appointment listed below.  Caller will not accept being placed on the waitlist and is requesting a message be sent to doctor.  Name of Caller:pt   When is the first available appointment? unknown  Symptoms:f/u   Would the patient rather a call back or a response via [facility]? Call back   Best Call Back Number: [phone]  Additional Information:

## 2019-06-27 NOTE — TELEPHONE ENCOUNTER
Contacted the pt; she stated she got a flair of lower back pain. Per  dose-Pack was sent to pt's pharmacy. Pt verbalized understanding.

## 2019-08-12 ENCOUNTER — OFFICE VISIT (OUTPATIENT)
Dept: PHYSICAL MEDICINE AND REHAB | Facility: CLINIC | Age: 39
End: 2019-08-12
Payer: MEDICAID

## 2019-08-12 VITALS
BODY MASS INDEX: 23 KG/M2 | HEART RATE: 82 BPM | DIASTOLIC BLOOD PRESSURE: 71 MMHG | SYSTOLIC BLOOD PRESSURE: 106 MMHG | RESPIRATION RATE: 14 BRPM | HEIGHT: 62 IN | WEIGHT: 125 LBS

## 2019-08-12 DIAGNOSIS — M54.10 RADICULAR SYNDROME OF LEFT LEG: ICD-10-CM

## 2019-08-12 DIAGNOSIS — M79.18 MYOFASCIAL PAIN: ICD-10-CM

## 2019-08-12 DIAGNOSIS — M47.817 LUMBOSACRAL SPONDYLOSIS WITHOUT MYELOPATHY: Primary | ICD-10-CM

## 2019-08-12 PROBLEM — K21.9 GASTROESOPHAGEAL REFLUX DISEASE: Status: ACTIVE | Noted: 2019-08-12

## 2019-08-12 PROCEDURE — 99999 PR PBB SHADOW E&M-EST. PATIENT-LVL III: ICD-10-PCS | Mod: PBBFAC,,, | Performed by: PHYSICAL MEDICINE & REHABILITATION

## 2019-08-12 PROCEDURE — 99213 OFFICE O/P EST LOW 20 MIN: CPT | Mod: PBBFAC | Performed by: PHYSICAL MEDICINE & REHABILITATION

## 2019-08-12 PROCEDURE — 99999 PR PBB SHADOW E&M-EST. PATIENT-LVL III: CPT | Mod: PBBFAC,,, | Performed by: PHYSICAL MEDICINE & REHABILITATION

## 2019-08-12 PROCEDURE — 99214 PR OFFICE/OUTPT VISIT, EST, LEVL IV, 30-39 MIN: ICD-10-PCS | Mod: S$PBB,,, | Performed by: PHYSICAL MEDICINE & REHABILITATION

## 2019-08-12 PROCEDURE — 99214 OFFICE O/P EST MOD 30 MIN: CPT | Mod: S$PBB,,, | Performed by: PHYSICAL MEDICINE & REHABILITATION

## 2019-08-12 RX ORDER — GABAPENTIN 300 MG/1
CAPSULE ORAL
Qty: 90 CAPSULE | Refills: 5 | Status: SHIPPED | OUTPATIENT
Start: 2019-08-12 | End: 2020-02-11 | Stop reason: SDUPTHER

## 2019-08-12 RX ORDER — TRAMADOL HYDROCHLORIDE 50 MG/1
50-100 TABLET ORAL 4 TIMES DAILY PRN
Qty: 240 TABLET | Refills: 5 | Status: SHIPPED | OUTPATIENT
Start: 2019-08-12 | End: 2019-09-03 | Stop reason: SDUPTHER

## 2019-08-12 RX ORDER — METHOCARBAMOL 750 MG/1
TABLET, FILM COATED ORAL
Qty: 30 TABLET | Refills: 5 | Status: SHIPPED | OUTPATIENT
Start: 2019-08-12 | End: 2021-01-25 | Stop reason: SDUPTHER

## 2019-08-12 NOTE — PROGRESS NOTES
PM&R  CLINIC NOTE    Chief Complaint   Patient presents with    Back Pain     on the right    Knee Pain     left     HPI: This is a 39 y.o. year old female being seen in clinic today for follow up back pain.  She has had a few accidents-mild concussion after falling while working.  Overall she is stable overall, but still having her aches/pains. She is concerned about her daughter developing a seizure disorder.  They are considering moving to [place] for her to try medicinal marijuana instead of traditional seizure meds    History obtained from patient    ROS  General- denies lethargy, weight change, fever, chills  Head/neck- denies swallowing difficulties  ENT- denies hearing changes  Cardiovascular-denies chest pain  Pulmonary- denies shortness of breath  GI- denies constipation or bowel incontinence  - denies bladder incontinence  Skin- denies wounds or rashes  Musculoskeletal- denies weakness, +pain  Neurologic- +numbness and tingling  Psychiatric- denies depressive or psychotic features, denies anxiety  Lymphatic-denies swelling  Endocrine- denies hypoglycemic symptoms/DM history    Physical Examination:  General: Well developed, well nourished female, NAD  HEENT: NCAT EOMI  Pulmonary: Normal respirations    Spinal Examination: LUMBAR or THORACIC  Active ROM is within normal limits.  Inspection: No deformity of spinal alignment. No palpable olisthesis.  Palpation: ttp si joints-worse on right, tight paraspinal muscles  ttp at 10/18 fibromyalgia points  Seated slr negative    Bilateral upper and lower Extremities:  Hip/Knee/Ankle ROM wnl  Bilateral Extremities show normal capillary refill. No signs of cyanosis, rubor, edema, skin changes, or dysvascular changes of appendages. Nails appear intact.  5/5 strength bilateral lower and upper exts    No focal atrophy is noted of either upper or lower extremity.    Gait: normal stride, narrow base and good arm swing.    IMPRESSION/PLAN: This is a 39 y.o. year old  female with mild lumbar DJD, left leg radiculitis-stable, myofascial pain/fibromyalgia, very mild CTS, right knee pain-stable, lateral epicondylitis     1. Cont core strengthening, myofascial release, stretch, ROM, modalities, HEP  2. Rec rheumatology  3. Cont si belt, cont neutral wrist braces  4. Cont gabapentin, robaxin, and tramadol prn  5. Fu 6 months    Mayra Adler MD  Physical Medicine and Rehab

## 2019-09-03 ENCOUNTER — TELEPHONE (OUTPATIENT)
Dept: PHYSICAL MEDICINE AND REHAB | Facility: CLINIC | Age: 39
End: 2019-09-03

## 2019-09-03 DIAGNOSIS — M47.817 LUMBOSACRAL SPONDYLOSIS WITHOUT MYELOPATHY: ICD-10-CM

## 2019-09-03 RX ORDER — TRAMADOL HYDROCHLORIDE 50 MG/1
50-100 TABLET ORAL 4 TIMES DAILY PRN
Qty: 240 TABLET | Refills: 5 | Status: SHIPPED | OUTPATIENT
Start: 2019-09-03 | End: 2020-02-11 | Stop reason: SDUPTHER

## 2019-09-03 NOTE — TELEPHONE ENCOUNTER
----- Message from Dong Wilson sent at 9/3/2019  9:42 AM CDT -----  Contact: Pt  Please give pt a call at .[phone] (home) she needs to have a script stating that the medicine is a  medical necessity       ..  [id number] [place] [place] - [address]  [address]  [place] [address]  Phone: [phone] Fax: [phone]

## 2020-01-04 DIAGNOSIS — M47.817 LUMBOSACRAL SPONDYLOSIS WITHOUT MYELOPATHY: ICD-10-CM

## 2020-01-06 RX ORDER — GABAPENTIN 300 MG/1
CAPSULE ORAL
Qty: 90 CAPSULE | Refills: 5 | OUTPATIENT
Start: 2020-01-06

## 2020-02-11 ENCOUNTER — OFFICE VISIT (OUTPATIENT)
Dept: PHYSICAL MEDICINE AND REHAB | Facility: CLINIC | Age: 40
End: 2020-02-11
Payer: MEDICAID

## 2020-02-11 VITALS
DIASTOLIC BLOOD PRESSURE: 66 MMHG | SYSTOLIC BLOOD PRESSURE: 111 MMHG | BODY MASS INDEX: 23 KG/M2 | WEIGHT: 125 LBS | HEART RATE: 86 BPM | RESPIRATION RATE: 12 BRPM | HEIGHT: 62 IN

## 2020-02-11 DIAGNOSIS — M79.18 MYOFASCIAL PAIN: ICD-10-CM

## 2020-02-11 DIAGNOSIS — M25.50 MULTIPLE JOINT PAIN: ICD-10-CM

## 2020-02-11 DIAGNOSIS — M47.817 LUMBOSACRAL SPONDYLOSIS WITHOUT MYELOPATHY: Primary | ICD-10-CM

## 2020-02-11 PROCEDURE — 99213 OFFICE O/P EST LOW 20 MIN: CPT | Mod: PBBFAC | Performed by: PHYSICAL MEDICINE & REHABILITATION

## 2020-02-11 PROCEDURE — 99999 PR PBB SHADOW E&M-EST. PATIENT-LVL III: ICD-10-PCS | Mod: PBBFAC,,, | Performed by: PHYSICAL MEDICINE & REHABILITATION

## 2020-02-11 PROCEDURE — 99999 PR PBB SHADOW E&M-EST. PATIENT-LVL III: CPT | Mod: PBBFAC,,, | Performed by: PHYSICAL MEDICINE & REHABILITATION

## 2020-02-11 PROCEDURE — 99214 PR OFFICE/OUTPT VISIT, EST, LEVL IV, 30-39 MIN: ICD-10-PCS | Mod: S$PBB,,, | Performed by: PHYSICAL MEDICINE & REHABILITATION

## 2020-02-11 PROCEDURE — 99214 OFFICE O/P EST MOD 30 MIN: CPT | Mod: S$PBB,,, | Performed by: PHYSICAL MEDICINE & REHABILITATION

## 2020-02-11 RX ORDER — GABAPENTIN 300 MG/1
CAPSULE ORAL
Qty: 90 CAPSULE | Refills: 5 | Status: SHIPPED | OUTPATIENT
Start: 2020-02-11 | End: 2020-07-09 | Stop reason: SDUPTHER

## 2020-02-11 RX ORDER — TRAMADOL HYDROCHLORIDE 50 MG/1
50-100 TABLET ORAL 4 TIMES DAILY PRN
Qty: 240 TABLET | Refills: 5 | Status: SHIPPED | OUTPATIENT
Start: 2020-02-11 | End: 2020-07-09 | Stop reason: SDUPTHER

## 2020-02-11 RX ORDER — LUBIPROSTONE 24 UG/1
CAPSULE ORAL
COMMUNITY
End: 2020-07-09

## 2020-02-11 RX ORDER — PANTOPRAZOLE SODIUM 40 MG/1
TABLET, DELAYED RELEASE ORAL
COMMUNITY
Start: 2017-05-04 | End: 2020-07-09

## 2020-02-11 RX ORDER — PROPRANOLOL HYDROCHLORIDE 20 MG/1
TABLET ORAL
COMMUNITY
Start: 2018-04-18 | End: 2020-07-09

## 2020-02-11 RX ORDER — CARBINOXAMINE MALEATE 4 MG/5ML
SYRUP ORAL
COMMUNITY
End: 2020-07-09

## 2020-02-11 RX ORDER — LEVOFLOXACIN 500 MG/1
TABLET, FILM COATED ORAL
COMMUNITY
End: 2020-07-09

## 2020-02-11 RX ORDER — ONDANSETRON 4 MG/1
TABLET, FILM COATED ORAL
COMMUNITY
Start: 2018-04-18 | End: 2021-01-25

## 2020-02-11 RX ORDER — DICYCLOMINE HYDROCHLORIDE 10 MG/1
CAPSULE ORAL
COMMUNITY
Start: 2018-06-13 | End: 2020-07-09

## 2020-02-11 NOTE — PROGRESS NOTES
PM&R  CLINIC NOTE    Chief Complaint   Patient presents with    Back Pain    Arthritis     HPI: This is a 39 y.o. year old female being seen in clinic today for follow up back pain and arthritis pain.  She is doing fairly well overall.  They have changed to a keto diet and she feels better as well as her daughter not having seizures.  They are packing up and getting ready for the move to [place].       History obtained from patient    ROS  General- denies lethargy, weight change, fever, chills  Head/neck- denies swallowing difficulties  ENT- denies hearing changes  Cardiovascular-denies chest pain  Pulmonary- denies shortness of breath  GI- denies constipation or bowel incontinence  - denies bladder incontinence  Skin- denies wounds or rashes  Musculoskeletal- denies weakness, +pain  Neurologic- +numbness and tingling  Psychiatric- denies depressive or psychotic features, denies anxiety  Lymphatic-denies swelling  Endocrine- denies hypoglycemic symptoms/DM history    Physical Examination:  General: Well developed, well nourished female, NAD  HEENT: NCAT EOMI  Pulmonary: Normal respirations    Spinal Examination: LUMBAR or THORACIC  Active ROM is within normal limits.  Inspection: No deformity of spinal alignment. No palpable olisthesis.  Palpation: not ttp si joints, still mild ttp and tight paraspinal muscles  ttp at 10/18 fibromyalgia points  Seated slr negative    Bilateral upper and lower Extremities:  Hip/Knee/Ankle ROM wnl  Bilateral Extremities show normal capillary refill. No signs of cyanosis, rubor, edema, skin changes, or dysvascular changes of appendages. Nails appear intact.  5/5 strength bilateral lower and upper exts    No focal atrophy is noted of either upper or lower extremity.    Gait: normal stride, narrow base and good arm swing.    IMPRESSION/PLAN: This is a 39 y.o. year old female with mild lumbar DJD, left leg radiculitis-stable, myofascial pain/fibromyalgia, very mild CTS, right knee  pain-stable, lateral epicondylitis     1. Cont core strengthening, myofascial release, stretch, ROM, modalities, HEP  2. Still rec rheumatology  3. Cont si belt, cont neutral wrist braces  4. Cont gabapentin, tramadol prn  5. Fu 6 months if still in town    Mayra Adler MD  Physical Medicine and Rehab

## 2020-07-01 ENCOUNTER — TELEPHONE (OUTPATIENT)
Dept: PHYSICAL MEDICINE AND REHAB | Facility: CLINIC | Age: 40
End: 2020-07-01

## 2020-07-01 NOTE — TELEPHONE ENCOUNTER
----- Message from Ayanna Hernandez sent at 7/1/2020 11:04 AM CDT -----  Regarding: Appt  Contact: pt  The pt request a call to schedule a appt, no additional info given and can be reached at [phone]///thxMW

## 2020-07-08 ENCOUNTER — TELEPHONE (OUTPATIENT)
Dept: PHYSICAL MEDICINE AND REHAB | Facility: CLINIC | Age: 40
End: 2020-07-08

## 2020-07-08 NOTE — TELEPHONE ENCOUNTER
----- Message from Mesfin Steen sent at 7/8/2020 11:19 AM CDT -----  Regarding: audio visit  Contact: Yue Obrien called to reschedule appointment to a audio visit appointment. Please call pt back at [phone]. Thanks tp

## 2020-07-09 ENCOUNTER — OFFICE VISIT (OUTPATIENT)
Dept: PHYSICAL MEDICINE AND REHAB | Facility: CLINIC | Age: 40
End: 2020-07-09
Payer: MEDICAID

## 2020-07-09 VITALS — HEIGHT: 62 IN | BODY MASS INDEX: 23 KG/M2 | WEIGHT: 125 LBS

## 2020-07-09 DIAGNOSIS — M47.817 LUMBOSACRAL SPONDYLOSIS WITHOUT MYELOPATHY: ICD-10-CM

## 2020-07-09 PROCEDURE — 99213 PR OFFICE/OUTPT VISIT, EST, LEVL III, 20-29 MIN: ICD-10-PCS | Mod: 95,,, | Performed by: PHYSICAL MEDICINE & REHABILITATION

## 2020-07-09 PROCEDURE — 99213 OFFICE O/P EST LOW 20 MIN: CPT | Mod: 95,,, | Performed by: PHYSICAL MEDICINE & REHABILITATION

## 2020-07-09 RX ORDER — TRAMADOL HYDROCHLORIDE 50 MG/1
50-100 TABLET ORAL 4 TIMES DAILY PRN
Qty: 240 TABLET | Refills: 5 | Status: SHIPPED | OUTPATIENT
Start: 2020-07-09 | End: 2021-01-25 | Stop reason: SDUPTHER

## 2020-07-09 RX ORDER — GABAPENTIN 300 MG/1
300 CAPSULE ORAL 4 TIMES DAILY
Qty: 120 CAPSULE | Refills: 11 | Status: SHIPPED | OUTPATIENT
Start: 2020-07-09 | End: 2020-07-14 | Stop reason: SDUPTHER

## 2020-07-09 NOTE — PROGRESS NOTES
The patient location is: [place], [place]    Visit type: audiovisual    Face to Face time with patient: 20min of total time spent on the encounter, which includes face to face time and non-face to face time preparing to see the patient (eg, review of tests), Obtaining and/or reviewing separately obtained history, Documenting clinical information in the electronic or other health record, Independently interpreting results (not separately reported) and communicating results to the patient/family/caregiver, or Care coordination (not separately reported).         Each patient to whom he or she provides medical services by telemedicine is:  (1) informed of the relationship between the physician and patient and the respective role of any other health care provider with respect to management of the patient; and (2) notified that he or she may decline to receive medical services by telemedicine and may withdraw from such care at any time.    Notes:       PM&R  CLINIC NOTE    Chief Complaint   Patient presents with    Arthritis       HPI: This is a 40 y.o. year old female being seen  today for follow up back pain and arthritis pain.  She has been quarantined at home with her daughter-parents nearby in [facility].  They are all frustrated as their move to [place] got put on hold by the pandemic.  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       History obtained from patient    ROS  General- denies lethargy, weight change, fever, chills  Head/neck- denies swallowing difficulties  ENT- denies hearing changes  Cardiovascular-denies chest pain  Pulmonary- denies shortness of breath  GI- denies constipation or bowel incontinence  - denies bladder incontinence  Skin- denies wounds or rashes  Musculoskeletal- denies weakness, +pain  Neurologic- +/-numbness and tingling  Psychiatric- denies depressive or psychotic features, denies  anxiety  Lymphatic-denies swelling  Endocrine- denies hypoglycemic symptoms/DM history    Physical Examination:  General: Well developed, well nourished female, NAD, sitting in chair   HEENT: NCAT EOMI  Pulmonary: Normal respirations, no signs of labored breathing    Spinal Examination: LUMBAR or THORACIC  Active ROM is within normal limits.  Inspection: No deformity of spinal alignment. No palpable olisthesis.  Per patient self Palpation: not ttp si joint,  mild ttp and tight paraspinal muscles      Bilateral upper and lower Extremities:  Hip/Knee/Ankle ROM wnl  Bilateral Extremities show normal capillary refill. No signs of cyanosis, rubor, edema, skin changes, or dysvascular changes of appendages. Nails appear intact.  Grossly at least 4/5 strength bilateral lower and upper exts based on movements    No focal atrophy is noted of either upper or lower extremity.    IMPRESSION/PLAN: This is a 40 y.o. year old female with mild lumbar DJD, left leg radiculitis-stable, myofascial pain/fibromyalgia, very mild CTS, right knee pain-stable    1. Cont core strengthening, myofascial release, stretch, ROM, modalities, HEP-try to stay active as much as possible  2. Still rec rheumatology when things calm down  3. Cont si belt, cont neutral wrist braces when working  4. Cont gabapentin, tramadol prn  5. Fu 6 months    Mayra Adler MD  Physical Medicine and Rehab

## 2020-07-14 DIAGNOSIS — M47.817 LUMBOSACRAL SPONDYLOSIS WITHOUT MYELOPATHY: ICD-10-CM

## 2020-07-14 RX ORDER — GABAPENTIN 300 MG/1
300 CAPSULE ORAL 4 TIMES DAILY
Qty: 120 CAPSULE | Refills: 11 | Status: SHIPPED | OUTPATIENT
Start: 2020-07-14 | End: 2021-01-25 | Stop reason: SDUPTHER

## 2021-01-20 ENCOUNTER — TELEPHONE (OUTPATIENT)
Dept: PHYSICAL MEDICINE AND REHAB | Facility: CLINIC | Age: 41
End: 2021-01-20

## 2021-01-25 ENCOUNTER — OFFICE VISIT (OUTPATIENT)
Dept: PHYSICAL MEDICINE AND REHAB | Facility: CLINIC | Age: 41
End: 2021-01-25
Payer: MEDICAID

## 2021-01-25 VITALS
RESPIRATION RATE: 16 BRPM | SYSTOLIC BLOOD PRESSURE: 109 MMHG | DIASTOLIC BLOOD PRESSURE: 69 MMHG | HEART RATE: 93 BPM | HEIGHT: 62 IN | WEIGHT: 120 LBS | BODY MASS INDEX: 22.08 KG/M2

## 2021-01-25 DIAGNOSIS — M47.817 LUMBOSACRAL SPONDYLOSIS WITHOUT MYELOPATHY: Primary | ICD-10-CM

## 2021-01-25 DIAGNOSIS — M79.18 MYOFASCIAL PAIN: ICD-10-CM

## 2021-01-25 DIAGNOSIS — R29.898 WEAKNESS OF BOTH HIPS: ICD-10-CM

## 2021-01-25 DIAGNOSIS — M54.10 RADICULAR SYNDROME OF LEFT LEG: ICD-10-CM

## 2021-01-25 PROCEDURE — 99999 PR PBB SHADOW E&M-EST. PATIENT-LVL III: ICD-10-PCS | Mod: PBBFAC,,, | Performed by: PHYSICAL MEDICINE & REHABILITATION

## 2021-01-25 PROCEDURE — 99999 PR PBB SHADOW E&M-EST. PATIENT-LVL III: CPT | Mod: PBBFAC,,, | Performed by: PHYSICAL MEDICINE & REHABILITATION

## 2021-01-25 PROCEDURE — 99214 PR OFFICE/OUTPT VISIT, EST, LEVL IV, 30-39 MIN: ICD-10-PCS | Mod: S$PBB,,, | Performed by: PHYSICAL MEDICINE & REHABILITATION

## 2021-01-25 PROCEDURE — 99213 OFFICE O/P EST LOW 20 MIN: CPT | Mod: PBBFAC | Performed by: PHYSICAL MEDICINE & REHABILITATION

## 2021-01-25 PROCEDURE — 99214 OFFICE O/P EST MOD 30 MIN: CPT | Mod: S$PBB,,, | Performed by: PHYSICAL MEDICINE & REHABILITATION

## 2021-01-25 RX ORDER — TRAMADOL HYDROCHLORIDE 50 MG/1
50-100 TABLET ORAL 4 TIMES DAILY PRN
Qty: 240 TABLET | Refills: 5 | Status: SHIPPED | OUTPATIENT
Start: 2021-01-25 | End: 2021-07-23 | Stop reason: SDUPTHER

## 2021-01-25 RX ORDER — METHOCARBAMOL 750 MG/1
TABLET, FILM COATED ORAL
Qty: 30 TABLET | Refills: 5 | Status: SHIPPED | OUTPATIENT
Start: 2021-01-25 | End: 2021-07-23 | Stop reason: SDUPTHER

## 2021-01-25 RX ORDER — METHOCARBAMOL 750 MG/1
TABLET, FILM COATED ORAL
Qty: 30 TABLET | Refills: 5 | Status: SHIPPED | OUTPATIENT
Start: 2021-01-25 | End: 2021-01-25 | Stop reason: SDUPTHER

## 2021-01-25 RX ORDER — GABAPENTIN 300 MG/1
300 CAPSULE ORAL 4 TIMES DAILY
Qty: 120 CAPSULE | Refills: 5 | Status: SHIPPED | OUTPATIENT
Start: 2021-01-25 | End: 2021-07-23 | Stop reason: SDUPTHER

## 2021-05-10 ENCOUNTER — PATIENT MESSAGE (OUTPATIENT)
Dept: RESEARCH | Facility: HOSPITAL | Age: 41
End: 2021-05-10

## 2021-07-23 ENCOUNTER — OFFICE VISIT (OUTPATIENT)
Dept: PHYSICAL MEDICINE AND REHAB | Facility: CLINIC | Age: 41
End: 2021-07-23
Payer: MEDICAID

## 2021-07-23 VITALS — RESPIRATION RATE: 14 BRPM

## 2021-07-23 DIAGNOSIS — M54.10 RADICULAR SYNDROME OF LEFT LEG: ICD-10-CM

## 2021-07-23 DIAGNOSIS — M47.817 LUMBOSACRAL SPONDYLOSIS WITHOUT MYELOPATHY: Primary | ICD-10-CM

## 2021-07-23 DIAGNOSIS — M79.18 MYOFASCIAL PAIN: ICD-10-CM

## 2021-07-23 PROCEDURE — 99999 PR PBB SHADOW E&M-EST. PATIENT-LVL III: ICD-10-PCS | Mod: PBBFAC,,, | Performed by: PHYSICAL MEDICINE & REHABILITATION

## 2021-07-23 PROCEDURE — 99999 PR PBB SHADOW E&M-EST. PATIENT-LVL III: CPT | Mod: PBBFAC,,, | Performed by: PHYSICAL MEDICINE & REHABILITATION

## 2021-07-23 PROCEDURE — 99213 OFFICE O/P EST LOW 20 MIN: CPT | Mod: PBBFAC | Performed by: PHYSICAL MEDICINE & REHABILITATION

## 2021-07-23 PROCEDURE — 99214 PR OFFICE/OUTPT VISIT, EST, LEVL IV, 30-39 MIN: ICD-10-PCS | Mod: S$PBB,,, | Performed by: PHYSICAL MEDICINE & REHABILITATION

## 2021-07-23 PROCEDURE — 99214 OFFICE O/P EST MOD 30 MIN: CPT | Mod: S$PBB,,, | Performed by: PHYSICAL MEDICINE & REHABILITATION

## 2021-07-23 RX ORDER — TRAMADOL HYDROCHLORIDE 50 MG/1
50-100 TABLET ORAL 4 TIMES DAILY PRN
Qty: 240 TABLET | Refills: 5 | Status: SHIPPED | OUTPATIENT
Start: 2021-07-23 | End: 2021-07-27 | Stop reason: SDUPTHER

## 2021-07-23 RX ORDER — METHOCARBAMOL 750 MG/1
TABLET, FILM COATED ORAL
Qty: 30 TABLET | Refills: 5 | Status: SHIPPED | OUTPATIENT
Start: 2021-07-23 | End: 2021-07-27 | Stop reason: SDUPTHER

## 2021-07-23 RX ORDER — GABAPENTIN 300 MG/1
300 CAPSULE ORAL 4 TIMES DAILY
Qty: 120 CAPSULE | Refills: 5 | Status: SHIPPED | OUTPATIENT
Start: 2021-07-23 | End: 2021-07-27 | Stop reason: SDUPTHER

## 2021-07-27 ENCOUNTER — TELEPHONE (OUTPATIENT)
Dept: PHYSICAL MEDICINE AND REHAB | Facility: CLINIC | Age: 41
End: 2021-07-27

## 2021-07-27 RX ORDER — TRAMADOL HYDROCHLORIDE 50 MG/1
50-100 TABLET ORAL 4 TIMES DAILY PRN
Qty: 240 TABLET | Refills: 5 | Status: SHIPPED | OUTPATIENT
Start: 2021-07-27 | End: 2022-05-09 | Stop reason: SDUPTHER

## 2021-07-27 RX ORDER — GABAPENTIN 300 MG/1
300 CAPSULE ORAL 4 TIMES DAILY
Qty: 120 CAPSULE | Refills: 5 | Status: SHIPPED | OUTPATIENT
Start: 2021-07-27 | End: 2022-05-09 | Stop reason: SDUPTHER

## 2021-07-27 RX ORDER — METHOCARBAMOL 750 MG/1
TABLET, FILM COATED ORAL
Qty: 30 TABLET | Refills: 5 | Status: SHIPPED | OUTPATIENT
Start: 2021-07-27 | End: 2022-06-08 | Stop reason: SDUPTHER

## 2022-01-18 ENCOUNTER — TELEPHONE (OUTPATIENT)
Dept: PHYSICAL MEDICINE AND REHAB | Facility: CLINIC | Age: 42
End: 2022-01-18
Payer: MEDICAID

## 2022-04-11 ENCOUNTER — TELEPHONE (OUTPATIENT)
Dept: OBSTETRICS AND GYNECOLOGY | Facility: CLINIC | Age: 42
End: 2022-04-11
Payer: MEDICAID

## 2022-05-09 DIAGNOSIS — M47.817 LUMBOSACRAL SPONDYLOSIS WITHOUT MYELOPATHY: ICD-10-CM

## 2022-05-09 DIAGNOSIS — M54.10 RADICULAR SYNDROME OF LEFT LEG: ICD-10-CM

## 2022-05-09 RX ORDER — TRAMADOL HYDROCHLORIDE 50 MG/1
TABLET ORAL
Qty: 240 TABLET | Refills: 5 | OUTPATIENT
Start: 2022-05-09

## 2022-05-09 RX ORDER — TRAMADOL HYDROCHLORIDE 50 MG/1
50-100 TABLET ORAL 4 TIMES DAILY PRN
Qty: 240 TABLET | Refills: 0 | Status: SHIPPED | OUTPATIENT
Start: 2022-05-09 | End: 2022-06-08 | Stop reason: SDUPTHER

## 2022-05-09 RX ORDER — GABAPENTIN 300 MG/1
300 CAPSULE ORAL 4 TIMES DAILY
Qty: 120 CAPSULE | Refills: 0 | Status: SHIPPED | OUTPATIENT
Start: 2022-05-09 | End: 2022-06-08 | Stop reason: SDUPTHER

## 2022-05-09 NOTE — TELEPHONE ENCOUNTER
----- Message from Ismael Andujar sent at 5/9/2022  1:39 PM CDT -----  Contact: self  Yue Reyes would like a call back at [phone], in regards to getting an appointment scheduled to refill her gabapentin (NEURONTIN and traMADoL (ULTRAM) 50 mg  medication. Pt would like to know if the medication can be refilled until she can get the appointment scheduled.

## 2022-06-08 ENCOUNTER — OFFICE VISIT (OUTPATIENT)
Dept: PHYSICAL MEDICINE AND REHAB | Facility: CLINIC | Age: 42
End: 2022-06-08
Payer: MEDICAID

## 2022-06-08 VITALS
BODY MASS INDEX: 22.08 KG/M2 | DIASTOLIC BLOOD PRESSURE: 68 MMHG | WEIGHT: 120 LBS | SYSTOLIC BLOOD PRESSURE: 121 MMHG | RESPIRATION RATE: 14 BRPM | HEIGHT: 62 IN | HEART RATE: 117 BPM

## 2022-06-08 DIAGNOSIS — G57.01 PIRIFORMIS SYNDROME OF RIGHT SIDE: ICD-10-CM

## 2022-06-08 DIAGNOSIS — M79.18 MYOFASCIAL PAIN: Primary | ICD-10-CM

## 2022-06-08 DIAGNOSIS — M47.817 LUMBOSACRAL SPONDYLOSIS WITHOUT MYELOPATHY: ICD-10-CM

## 2022-06-08 PROCEDURE — 3078F DIAST BP <80 MM HG: CPT | Mod: CPTII,,, | Performed by: PHYSICAL MEDICINE & REHABILITATION

## 2022-06-08 PROCEDURE — 99999 PR PBB SHADOW E&M-EST. PATIENT-LVL III: CPT | Mod: PBBFAC,,, | Performed by: PHYSICAL MEDICINE & REHABILITATION

## 2022-06-08 PROCEDURE — 3008F PR BODY MASS INDEX (BMI) DOCUMENTED: ICD-10-PCS | Mod: CPTII,,, | Performed by: PHYSICAL MEDICINE & REHABILITATION

## 2022-06-08 PROCEDURE — 1160F PR REVIEW ALL MEDS BY PRESCRIBER/CLIN PHARMACIST DOCUMENTED: ICD-10-PCS | Mod: CPTII,,, | Performed by: PHYSICAL MEDICINE & REHABILITATION

## 2022-06-08 PROCEDURE — 1159F PR MEDICATION LIST DOCUMENTED IN MEDICAL RECORD: ICD-10-PCS | Mod: CPTII,,, | Performed by: PHYSICAL MEDICINE & REHABILITATION

## 2022-06-08 PROCEDURE — 99214 PR OFFICE/OUTPT VISIT, EST, LEVL IV, 30-39 MIN: ICD-10-PCS | Mod: S$PBB,,, | Performed by: PHYSICAL MEDICINE & REHABILITATION

## 2022-06-08 PROCEDURE — 99999 PR PBB SHADOW E&M-EST. PATIENT-LVL III: ICD-10-PCS | Mod: PBBFAC,,, | Performed by: PHYSICAL MEDICINE & REHABILITATION

## 2022-06-08 PROCEDURE — 99213 OFFICE O/P EST LOW 20 MIN: CPT | Mod: PBBFAC | Performed by: PHYSICAL MEDICINE & REHABILITATION

## 2022-06-08 PROCEDURE — 3078F PR MOST RECENT DIASTOLIC BLOOD PRESSURE < 80 MM HG: ICD-10-PCS | Mod: CPTII,,, | Performed by: PHYSICAL MEDICINE & REHABILITATION

## 2022-06-08 PROCEDURE — 1159F MED LIST DOCD IN RCRD: CPT | Mod: CPTII,,, | Performed by: PHYSICAL MEDICINE & REHABILITATION

## 2022-06-08 PROCEDURE — 3074F SYST BP LT 130 MM HG: CPT | Mod: CPTII,,, | Performed by: PHYSICAL MEDICINE & REHABILITATION

## 2022-06-08 PROCEDURE — 3008F BODY MASS INDEX DOCD: CPT | Mod: CPTII,,, | Performed by: PHYSICAL MEDICINE & REHABILITATION

## 2022-06-08 PROCEDURE — 3074F PR MOST RECENT SYSTOLIC BLOOD PRESSURE < 130 MM HG: ICD-10-PCS | Mod: CPTII,,, | Performed by: PHYSICAL MEDICINE & REHABILITATION

## 2022-06-08 PROCEDURE — 1160F RVW MEDS BY RX/DR IN RCRD: CPT | Mod: CPTII,,, | Performed by: PHYSICAL MEDICINE & REHABILITATION

## 2022-06-08 PROCEDURE — 99214 OFFICE O/P EST MOD 30 MIN: CPT | Mod: S$PBB,,, | Performed by: PHYSICAL MEDICINE & REHABILITATION

## 2022-06-08 RX ORDER — METHOCARBAMOL 750 MG/1
TABLET, FILM COATED ORAL
Qty: 30 TABLET | Refills: 5 | Status: SHIPPED | OUTPATIENT
Start: 2022-06-08

## 2022-06-08 RX ORDER — GABAPENTIN 300 MG/1
300 CAPSULE ORAL 4 TIMES DAILY
Qty: 360 CAPSULE | Refills: 2 | Status: SHIPPED | OUTPATIENT
Start: 2022-06-08

## 2022-06-08 RX ORDER — TRAMADOL HYDROCHLORIDE 50 MG/1
50-100 TABLET ORAL 4 TIMES DAILY PRN
Qty: 240 TABLET | Refills: 5 | Status: SHIPPED | OUTPATIENT
Start: 2022-06-08

## 2022-06-08 NOTE — PROGRESS NOTES
PM&R  CLINIC NOTE    Chief Complaint   Patient presents with    Arthritis    Back Pain    Hip Pain     HPI: This is a 42 y.o. year old female being seen  today for follow up back pain and arthritis pain.  She has officially moved to [place], but having difficulty establishing care in a timely manner.  She is doing well overall, but having more hip pain with prolonged car rides.  They have been hiking more outdoors which can increase pain at times    History obtained from patient    ROS  General- denies lethargy, weight change, fever, chills  Head/neck- denies swallowing difficulties  ENT- denies hearing changes  Cardiovascular-denies chest pain  Pulmonary- denies shortness of breath  GI- denies constipation or bowel incontinence  - denies bladder incontinence  Skin- denies wounds or rashes  Musculoskeletal- denies weakness, +pain  Neurologic- +/-numbness and tingling  Psychiatric- denies depressive or psychotic features, denies anxiety  Lymphatic-denies swelling  Endocrine- denies hypoglycemic symptoms/DM history    Physical Examination:  General: Well developed, well nourished female, NAD  HEENT: NCAT EOMI  Pulmonary: Normal respirations    Spinal Examination: LUMBAR or THORACIC  Active ROM is within normal limits.  Inspection: No deformity of spinal alignment. No palpable olisthesis.  Palpation:  still tight paraspinal muscles and not ttp at GT bursas, very ttp at piriformis-right worse than left  slr neg bilaterally    Bilateral upper and lower Extremities:  Hip/Knee/Ankle ROM wnl  Bilateral Extremities show normal capillary refill. No signs of cyanosis, rubor, edema, skin changes, or dysvascular changes of appendages. Nails appear intact.  5/5 bilateral lower exts except 4/5 hip add/abd    No focal atrophy is noted of either upper or lower extremity.    IMPRESSION/PLAN: This is a 42 y.o. year old female with mild lumbar DJD, piriformis syndrome, myofascial pain/fibromyalgia, very mild CTS, right knee  pain-stable    1. Cont core strengthening, myofascial release, stretch, ROM, modalities, HEP  2. Piriformis stretches provided  3. Cont si belt, cont neutral wrist braces when working  4. Cont gabapentin, tramadol prn, muscle relaxant prn  5. Fu prn, pt officially moved to [place], trying to establish all medical care there    Mayra Adler MD  Physical Medicine and Rehab